# Patient Record
(demographics unavailable — no encounter records)

---

## 2024-11-05 NOTE — ASSESSMENT
[FreeTextEntry1] : Mr. Araujo is a 76 yo man w/ hx of recent dx AFib who presents today for f/up.  #Paroxysmal AFib Dx in 10/2024 after presenting w/ palpitations iso COVID. [id number] is 2 (Age x2). On Apixaban now and in NSR. Discussed w/ pt risk of recurrence of AFib, elevated CVA risk. Pt could consider EP eval for ablation. - Continue Apixaban 5mg BID - Metop tartrate 12.5mg BID and prn (pt prefers this than long acting) - f/up w/ EP, has appt w/ Dr. Barclay in later 11/2024 - will likely require NÉSTOR/DCCV at that time - gave guidance on CVA prevention and continued AC as well as triggers of AFib  RTC in 6m

## 2024-11-05 NOTE — HISTORY OF PRESENT ILLNESS
[FreeTextEntry1] : Mr. Araujo is a 78 yo man w/ hx of recent dx AFib who presents today for f/up.  Last seen by Dr. Alaniz in cardiology clinic in 6/2024, at that time was having infrequent palpitations. He was recently hospitalized in 10/2024 after preseting w/ palpitations and was found to be in AFib and COVID+. TTE done was wnl. Pt has pending EP outpt appt scheduled. He has been doing well since DC, reports his palpitations resolved and believes he is in NSR (which he is in on EKG today). He is still taking his apixaban and metop 12.5 BID (pt prefers short acting medications so he can take extra doses prn for palpitations. Denies any bleeding issues, denies lightheadedness, dizziness, chest pain, palp, VALERO/SOB, LE swelling.  CV Meds: Metop 12.5mg BID Apixaban 5mg BID  TTE 10/2024 EF 55%, normal LV size anf fx, normal RV size  MCOT 6/2022 Predom rhythm was NSR, 2% PVC burden, 12 sx triggers

## 2024-11-05 NOTE — ASSESSMENT
[FreeTextEntry1] : Mr. Araujo is a 78 yo man w/ hx of recent dx AFib who presents today for f/up.  #Paroxysmal AFib Dx in 10/2024 after presenting w/ palpitations iso COVID. [id number] is 2 (Age x2). On Apixaban now and in NSR. Discussed w/ pt risk of recurrence of AFib, elevated CVA risk. Pt could consider EP eval for ablation. - Continue Apixaban 5mg BID - Metop tartrate 12.5mg BID and prn (pt prefers this than long acting) - f/up w/ EP, has appt w/ Dr. Barclay in later 11/2024 - will likely require NÉSTOR/DCCV at that time - gave guidance on CVA prevention and continued AC as well as triggers of AFib  RTC in 6m

## 2024-11-05 NOTE — ASSESSMENT
[FreeTextEntry1] : Mr. Araujo is a 76 yo man w/ hx of recent dx AFib who presents today for f/up.  #Paroxysmal AFib Dx in 10/2024 after presenting w/ palpitations iso COVID. [id number] is 2 (Age x2). On Apixaban now and in NSR. Discussed w/ pt risk of recurrence of AFib, elevated CVA risk. Pt could consider EP eval for ablation. - Continue Apixaban 5mg BID - Metop tartrate 12.5mg BID and prn (pt prefers this than long acting) - f/up w/ EP, has appt w/ Dr. Barclay in later 11/2024 - will likely require NÉSTOR/DCCV at that time - gave guidance on CVA prevention and continued AC as well as triggers of AFib  RTC in 6m

## 2024-11-05 NOTE — HISTORY OF PRESENT ILLNESS
[FreeTextEntry1] : Mr. Araujo is a 76 yo man w/ hx of recent dx AFib who presents today for f/up.  Last seen by Dr. Alaniz in cardiology clinic in 6/2024, at that time was having infrequent palpitations. He was recently hospitalized in 10/2024 after preseting w/ palpitations and was found to be in AFib and COVID+. TTE done was wnl. Pt has pending EP outpt appt scheduled. He has been doing well since DC, reports his palpitations resolved and believes he is in NSR (which he is in on EKG today). He is still taking his apixaban and metop 12.5 BID (pt prefers short acting medications so he can take extra doses prn for palpitations. Denies any bleeding issues, denies lightheadedness, dizziness, chest pain, palp, VALERO/SOB, LE swelling.  CV Meds: Metop 12.5mg BID Apixaban 5mg BID  TTE 10/2024 EF 55%, normal LV size anf fx, normal RV size  MCOT 6/2022 Predom rhythm was NSR, 2% PVC burden, 12 sx triggers

## 2024-11-26 NOTE — DISCUSSION/SUMMARY
[FreeTextEntry1] :  Gayle is a pleasant 77 year old gentleman with PMH history significant for paroxysmal atrial fibrillation who presents today to establish care.   We discussed the pathophysiology and natural history of atrial fibrillation. We discussed the triggers of atrial fibrillation and the need for stroke prevention based on [id number] (2) in him. He expresses reluctance in starting anticoagulation unless he has another episode and feels similiarly for an ablation. He was advised that he should monitor his heart rate twice a day using home monitoring device and apple watch but regardless the recommendation is for him to be on Eliquis twice a day. We also discussed to wait and watch if he has another episode of AF and discuss a potential ablation at that time.   All his questions were answered to his apparent satisfaction.

## 2024-11-26 NOTE — HISTORY OF PRESENT ILLNESS
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   He reports being diagnosed with COVID and admitted in 10/24 and during that time was first diagnosed with atrial fibrillation. He spontaneously reverted to NSR. He was started on metoprolol and eliquis. He has been compliant with the metoprolol but self dc'ed eliquis. He had a TTE done in 10/24 that showed normal biventricular function EF60%, no significant valvular disease.   His EKG today shows NSR at 81 bpm.  He reports monitoring his heart at home and since he has not had recurrence of AF since after that one episode, he is reluctant to continue blood thinners.  He is otherwise well. 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 He denied chest pain, palpitations, dizziness, syncope and pre-syncope.

## 2024-11-26 NOTE — END OF VISIT
[] : Fellow [FreeTextEntry3] : reinforced the need for TE prophylaxis will consider for ablation pending course

## 2024-11-26 NOTE — DISCUSSION/SUMMARY
[FreeTextEntry1] :  Gayle is a pleasant 77 year old gentleman with PMH history significant for paroxysmal atrial fibrillation who presents today to establish care.   We discussed the pathophysiology and natural history of atrial fibrillation. We discussed the triggers of atrial fibrillation and the need for stroke prevention based on GLNOO2Fuie (2) in him. He expresses reluctance in starting anticoagulation unless he has another episode and feels similiarly for an ablation. He was advised that he should monitor his heart rate twice a day using home monitoring device and apple watch but regardless the recommendation is for him to be on Eliquis twice a day. We also discussed to wait and watch if he has another episode of AF and discuss a potential ablation at that time.   All his questions were answered to his apparent satisfaction.

## 2024-11-26 NOTE — HISTORY OF PRESENT ILLNESS
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   He reports being diagnosed with COVID and admitted in 10/24 and during that time was first diagnosed with atrial fibrillation. He spontaneously reverted to NSR. He was started on metoprolol and eliquis. He has been compliant with the metoprolol but self dc'ed eliquis. He had a TTE done in 10/24 that showed normal biventricular function EF60%, no significant valvular disease.   His EKG today shows NSR at 81 bpm.  He reports monitoring his heart at home and since he has not had recurrence of AF since after that one episode, he is reluctant to continue blood thinners.  He is otherwise well. 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 He denied chest pain, palpitations, dizziness, syncope and pre-syncope.

## 2024-12-10 NOTE — HISTORY OF PRESENT ILLNESS
[FreeTextEntry1] : Gonzalez is a 77-year-old gentleman last month developed A-fib while being treated for COVID. He went to urgent care who sent him to ER- admitted and self converted with metoprolol. He stopped the ELiquis secondary to cost. 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  He has history of brain tumor and had PE at that time. He was on coumadin and 2 weeks later had sharp pain. Stopped coumadin and symptoms resolved. THis was 30 years ago. He is afraid of bleeding.

## 2024-12-10 NOTE — DISCUSSION/SUMMARY
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  #1 CV- normal ECHO, prior h/o PVC, had seen Dr. Barclay in the recent past #2 A-fib- although in the setting of COVID should remain on anticoagulation, start Eliquis 5mg bid, event monitor placed today, discussed atrial occlusion #3 Neuro- s/p brain surgery 30 ears ago #4 Vascular- PE 30 years ago with possible bleeding complication [EKG obtained to assist in diagnosis and management of assessed problem(s)] : EKG obtained to assist in diagnosis and management of assessed problem(s)

## 2024-12-10 NOTE — HISTORY OF PRESENT ILLNESS
[FreeTextEntry1] : Gonzalez is a 77-year-old gentleman last month developed A-fib while being treated for COVID. He went to urgent care who sent him to ER- admitted and self converted with metoprolol. He stopped the ELiquis secondary to cost. 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  He has history of brain tumor and had PE at that time. He was on coumadin and 2 weeks later had sharp pain. Stopped coumadin and symptoms resolved. THis was 30 years ago. He is afraid of bleeding.

## 2024-12-10 NOTE — DISCUSSION/SUMMARY
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  #1 CV- normal ECHO, prior h/o PVC, had seen Dr. Barclay in the recent past #2 A-fib- although in the setting of COVID should remain on anticoagulation, start Eliquis 5mg bid, event monitor placed today, discussed atrial occlusion #3 Neuro- s/p brain surgery 30 ears ago #4 Vascular- PE 30 years ago with possible bleeding complication [EKG obtained to assist in diagnosis and management of assessed problem(s)] : EKG obtained to assist in diagnosis and management of assessed problem(s)

## 2025-01-21 NOTE — DISCUSSION/SUMMARY
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 #1 CV- normal ECHO, prior h/o PVC, had seen Dr. Barclay in the recent past #2 A-fib- c/w Eliquis 5mg bid, event monitor results reviewed, c/w metoprolol 12.5mg bid. #3 Neuro- s/p brain surgery 30 ears ago #4 Vascular- PE 30 years ago with possible bleeding complication [EKG obtained to assist in diagnosis and management of assessed problem(s)] : EKG obtained to assist in diagnosis and management of assessed problem(s)

## 2025-01-21 NOTE — HISTORY OF PRESENT ILLNESS
[FreeTextEntry1] : Gonzalez finally started the Eliquis and metoprolol. Still has some palpitations but short duration without associated symptoms.

## 2025-01-21 NOTE — DISCUSSION/SUMMARY
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 #1 CV- normal ECHO, prior h/o PVC, had seen Dr. Barclay in the recent past #2 A-fib- c/w Eliquis 5mg bid, event monitor results reviewed, c/w metoprolol 12.5mg bid. #3 Neuro- s/p brain surgery 30 ears ago #4 Vascular- PE 30 years ago with possible bleeding complication [EKG obtained to assist in diagnosis and management of assessed problem(s)] : EKG obtained to assist in diagnosis and management of assessed problem(s)

## 2025-05-06 NOTE — HISTORY OF PRESENT ILLNESS
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

## 2025-05-06 NOTE — DISCUSSION/SUMMARY
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 #1 CV- normal ECHO, prior h/o PVC, had seen Dr. Barclay in the recent past #2 A-fib- c/w Eliquis 5mg bid, event monitor results reviewed, c/w metoprolol 12.5mg bid. #3 Neuro- s/p brain surgery 30 ears ago #4 Vascular- PE 30 years ago with possible bleeding complication

## 2025-05-19 NOTE — HISTORY OF PRESENT ILLNESS
[TextBox_4] : Mr. BUITRAGO is a 77 year old male originally from [place] with a history of former smoker (~10 years, last ~1980), COVID-19 (2024), paroxysmal Afib 10/2023, PVCs following COVID vaccine, s/p travel to [place] and [place] (4/2025) presenting to the office today for an initial pulmonary evaluation. His chief complaint is  -he notes 3 weeks after receiving the COVID vaccine he developed an arrhythmia and started on Metoprolol. It took him one year to recover, around [id number] -he notes when traveling he felt irritated throat and producing mucus that was gray and yellow in color -he notes having this before but not sure when -he notes having a PNA that was bad in the past but nothing like this now -he notes having a sensitivity to perfume scents -he denies any seasonal/environmental allergies that he knows off -he notes one episode of urticaria a long time ago -he notes having acid reflux and heartburn -he notes giving up drinking tea -he denies any positive sick contacts -he notes some snoring -he notes using mouth tape for snoring when he sleeps, which has helped his quality of sleep -he notes previously having headaches upon waking up which has since resolved  -he denies any headaches, nausea, emesis, fever, chills, sweats, chest pain, chest pressure, coughing, wheezing, palpitations, diarrhea, constipation, dysphagia, vertigo, arthralgias, myalgias, leg swelling, itchy eyes, itchy ears, or sour taste in the mouth.

## 2025-05-19 NOTE — PHYSICAL EXAM
[No Acute Distress] : no acute distress [Normal Oropharynx] : normal oropharynx [III] : Mallampati Class: III [Normal Appearance] : normal appearance [No Neck Mass] : no neck mass [Normal Rate/Rhythm] : normal rate/rhythm [Normal S1, S2] : normal s1, s2 [No Murmurs] : no murmurs [No Resp Distress] : no resp distress [No Abnormalities] : no abnormalities [Benign] : benign [Normal Gait] : normal gait [No Clubbing] : no clubbing [No Cyanosis] : no cyanosis [No Edema] : no edema [FROM] : FROM [Normal Color/ Pigmentation] : normal color/ pigmentation [No Focal Deficits] : no focal deficits [Oriented x3] : oriented x3 [Normal Affect] : normal affect [TextBox_2] : thin [TextBox_68] : I:E ratio 1:3; expiratory wheeze B/L

## 2025-05-19 NOTE — REASON FOR VISIT
[Initial] : an initial visit [TextBox_44] : SOB, acute bronchitis, asthmatic type bronchitis, snoring ?KAMARI

## 2025-05-19 NOTE — ASSESSMENT
[FreeTextEntry1] : Mr. BUITRAGO is a 77 year old male originally from [place] with a history of former smoker (~10 years, last ~1980), COVID-19 (2024), paroxysmal Afib 10/2023, PVCs following COVID vaccine, s/p travel to [place] and [place] (4/2025) who now comes to the office for an initial pulmonary evaluation for SOB, acute bronchitis, asthmatic type bronchitis, ?allergy/sinus, GERD, snoring ?KAMARI     His shortness of breath is multifactorial due to: -poor mechanics of breathing -out of shape -pulmonary disease   -acute bronchitis/asthmatic type bronchitis  -cardiac disease    -doubtful at this time     Problem 1: acute bronchitis/asthmatic type bronchitis -add Trelegy 200 1 puff QD -add Prednisone 20 mg x 7 days, 10 mg x 7 days -complete blood work: mycoplasma, chlamydia pneumoniae, and pertussis titers -Patient has a clinical scenario most consistent with acute bronchitis, the etiology of which is unknown, but empiric antibiotics are indicated. Hydration and mucolytics (including Mucinex, Robitussin, etc.) Cough controlling agents will be needed.  Problem 1A: respiratory muscle weakness -recommended the Breather of POWERbreathe Medic Plus IMT (30 reps, 2X per day) -revealed by either reduction in a patient's maximum inspiratory pressure (PI max) or maximum expiratory pressure (PE max), or both measured at the mouth. This can be related to a variety of medical issues such as neuromuscular disease, thyroid/parathyroid diseases, infections, poor nutrition, etc   Problem 2: ?allergies/sinus -complete blood work: asthma panel, food IgE panel, IgE level, eosinophil level, vitamin D level -Environmental measures for allergies were encouraged including mattress and pillow covers, air purifier, and environmental controls.   Problem 3: GERD -add Pepcid 40 mg QHS -Rule of 2s: avoid eating too much, eating too late, eating too spicy, eating two hours before bed. -Things to avoid including overeating, spicy foods, tight clothing, eating within three hours of bed, this list is not all inclusive. -For treatment of reflux, possible options discussed including diet control, H2 blockers, PPIs, as well as coating motility agents discussed as treatment options. Timing of meals and proximity of last meal to sleep were discussed. If symptoms persist, a formal gastrointestinal evaluation is needed.   Problem 4: ?KAMARI (elevated Mallampati class, poor quality sleep, snoring, neck size >16) -complete home sleep study -continue using snore tape -Sleep apnea is associated with adverse clinical consequences which can affect most organ systems. Cardiovascular disease risk includes arrhythmias, atrial fibrillation, hypertension, coronary artery disease, and stroke. Metabolic disorders include diabetes type 2, non-alcoholic fatty liver disease. Mood disorder especially depression; and cognitive decline especially in the elderly. Associations with chronic reflux/Pandey's esophagus some but not all inclusive. -Reasons include arousal consistent with hypopnea; respiratory events most prominent in REM sleep or supine position; therefore sleep staging and body position are important for accurate diagnosis and estimation of AHI.  Problem 5: cardiac disease -recommended to continue to follow up with Cardiologist ([facility])   Problem 6: poor breathing mechanics -Recommended Erick Choi breathing technique -Proper breathing techniques were reviewed with an emphasis on exhalation. Patient was instructed to breathe in for 1 second and out for 4 seconds. The patient was encouraged to not talk while walking.   Problem 7: out of shape -Weight loss, exercise, and diet control were discussed and are highly encouraged. Treatment options are given such as aqua therapy, and contacting a nutritionist. Recommended to use the elliptical, stationary bike, less use of the treadmill.   Problem 8: health maintenance -recommended yearly flu shot after October 15, 2024 -recommended strep pneumonia vaccines: Prevnar-20 vaccine -recommended early intervention for Upper Respiratory Infections (URIs) -recommended regular osteoporosis evaluations -recommended early dermatological evaluations -recommended after the age of 50 to the age of 70, colonoscopy every 5 years       F/P in 6-8 weeks. He is encouraged to call with any changes, concerns, or questions

## 2025-05-19 NOTE — ADDENDUM
[FreeTextEntry1] : Documented by Crisipn Fernandez acting as a scribe for Dr. Rashard Vaughan on 05/19/2025. All medical record entries made by the Scribe were at my, Dr. Rashard Vaughan's, direction and personally dictated by me on 05/19/2025. I have reviewed the chart and agree that the record accurately reflects my personal performance of the history, physical exam, assessment and plan. I have also personally directed, reviewed, and agree with the discharge instructions.

## 2025-05-19 NOTE — PROCEDURE
[FreeTextEntry1] : CXR (05/19/2025) reveals a normal sized heart; no evidence of infiltrate or effusion--a normal appearing chest radiograph  Full PFT reveals mild restrictive dysfunction; FEV1 was 1.77 L which is 62% of predicted, 18% change on BD; normal lung volumes; normal diffusion at 19.89, which is 80% of predicted; normal flow volume loop. GALINDO: MIP 61%, MEP 52% PFTs were performed to evaluate for SOB  6-minute walk test reveals a low saturation of 91% with evidence of no dyspnea or fatigue; walked 456.4 meters  FENO was 25; a normal value being less than 25 Fractional exhaled nitric oxide (FENO) is regarded as a simple, noninvasive method for assessing eosinophilic airway inflammation. Produced by a variety of cells within the lung, nitric oxide (NO) concentrations are generally low in healthy individuals. However, high concentrations of NO appear to be involved in nonspecific host defense mechanisms and chronic inflammatory diseases such as asthma. The American Thoracic Society (ATS) therefore has recommended using FENO to aid in the diagnosis and monitoring of eosinophilic airway inflammation and asthma, and for identifying steroid responsive individuals whose chronic respiratory symptoms may be caused by airway inflammation.